# Patient Record
Sex: MALE | URBAN - METROPOLITAN AREA
[De-identification: names, ages, dates, MRNs, and addresses within clinical notes are randomized per-mention and may not be internally consistent; named-entity substitution may affect disease eponyms.]

---

## 2022-07-16 ENCOUNTER — NURSE TRIAGE (OUTPATIENT)
Dept: ADMINISTRATIVE | Facility: CLINIC | Age: 62
End: 2022-07-16

## 2022-07-16 NOTE — TELEPHONE ENCOUNTER
"    Reason for Disposition   SEVERE difficulty swallowing (e.g., drooling or spitting, can't swallow water)    Additional Information   Negative: [1] Severe difficulty swallowing (e.g., drooling or spitting) AND [2] started suddenly after taking a medicine or allergic food   Negative: Wheezing, stridor, hoarseness, or difficulty breathing   Negative: [1] Swollen tongue AND [2] sudden onset   Negative: Sounds like a life-threatening emergency to the triager    Protocols used: SWALLOWING DIFFICULTY-A-ALAAN Barry states he was eating chicken last night, says he feels like a piece of chicken is lodged in his esophagus.  Cannot drink or eat since happened, and says cannot swallow any water. Distinct feeling of "something stuck in my throat."  Says that "usually when this happens it goes down eventually, but this time is different."  States he has no SOB, able to speak without noticeable distress.  Per [facility] triage protocol, recommend ED now for evaluation.  Encouraged him to go to closest ED now.  Said he will. No pcp; is from [place] and not familiar with .  Provided him with location of [facility] ED, which is closest to his home on [address].    "